# Patient Record
Sex: MALE | URBAN - METROPOLITAN AREA
[De-identification: names, ages, dates, MRNs, and addresses within clinical notes are randomized per-mention and may not be internally consistent; named-entity substitution may affect disease eponyms.]

---

## 2022-03-01 ENCOUNTER — OFFICE VISIT (OUTPATIENT)
Dept: URBAN - METROPOLITAN AREA CLINIC 43 | Facility: CLINIC | Age: 47
End: 2022-03-01
Payer: COMMERCIAL

## 2022-03-01 DIAGNOSIS — T15.01XA FOREIGN BODY IN CORNEA, RIGHT EYE: Primary | ICD-10-CM

## 2022-03-01 PROCEDURE — 92002 INTRM OPH EXAM NEW PATIENT: CPT | Performed by: OPTOMETRIST

## 2022-03-01 PROCEDURE — 65222 REMOVE FOREIGN BODY FROM EYE: CPT | Performed by: OPTOMETRIST

## 2022-03-01 NOTE — IMPRESSION/PLAN
Impression: Foreign body in cornea, right eye: T15.01xA. Plan: Left in eye for 4 days, mild K edema, checked for penetrating injury, injury bret negative and well formed AC.  will avoid BCL at this time due to infiltrate/k edema. fb removed with 1 drop proparacaine and spud, epi defect remains, dwp may feel like fb still in eye.   Start oflox QID OD, start ATs QID OD, f/u in 2 days